# Patient Record
Sex: FEMALE | Race: WHITE | URBAN - NONMETROPOLITAN AREA
[De-identification: names, ages, dates, MRNs, and addresses within clinical notes are randomized per-mention and may not be internally consistent; named-entity substitution may affect disease eponyms.]

---

## 2017-11-14 ENCOUNTER — APPOINTMENT (RX ONLY)
Dept: URBAN - NONMETROPOLITAN AREA CLINIC 22 | Facility: CLINIC | Age: 67
Setting detail: DERMATOLOGY
End: 2017-11-14

## 2017-11-14 VITALS — WEIGHT: 225 LBS | HEIGHT: 67 IN

## 2017-11-14 DIAGNOSIS — D485 NEOPLASM OF UNCERTAIN BEHAVIOR OF SKIN: ICD-10-CM

## 2017-11-14 PROBLEM — D48.5 NEOPLASM OF UNCERTAIN BEHAVIOR OF SKIN: Status: ACTIVE | Noted: 2017-11-14

## 2017-11-14 PROCEDURE — 11100: CPT

## 2017-11-14 PROCEDURE — ? BIOPSY BY SHAVE METHOD

## 2017-11-14 ASSESSMENT — LOCATION SIMPLE DESCRIPTION DERM: LOCATION SIMPLE: LEFT GREAT TOE

## 2017-11-14 ASSESSMENT — LOCATION DETAILED DESCRIPTION DERM: LOCATION DETAILED: LEFT DORSAL GREAT TOE

## 2017-11-14 ASSESSMENT — LOCATION ZONE DERM: LOCATION ZONE: TOE

## 2017-11-14 NOTE — HPI: WARTS ([id number])
Is This A New Presentation, Or A Follow-Up?: Wart
How Severe Are Your Warts?: mild
Additional History: The patient states that her PCP only dug at the lesion.

## 2017-11-14 NOTE — PROCEDURE: BIOPSY BY SHAVE METHOD
Path Notes (To The Dermatopathologist): During biopsy viscous straw colored fluid expressed under keratotic crust with ulceration present and granulation tissue at the base

## 2017-12-05 ENCOUNTER — APPOINTMENT (RX ONLY)
Dept: URBAN - NONMETROPOLITAN AREA CLINIC 22 | Facility: CLINIC | Age: 67
Setting detail: DERMATOLOGY
End: 2017-12-05

## 2017-12-05 VITALS — WEIGHT: 215 LBS | HEIGHT: 67 IN

## 2017-12-05 DIAGNOSIS — M71 OTHER BURSOPATHIES: ICD-10-CM

## 2017-12-05 PROBLEM — M71.30 OTHER BURSAL CYST, UNSPECIFIED SITE: Status: ACTIVE | Noted: 2017-12-05

## 2017-12-05 PROCEDURE — 99212 OFFICE O/P EST SF 10 MIN: CPT

## 2017-12-05 PROCEDURE — ? COUNSELING

## 2017-12-05 PROCEDURE — ? DIAGNOSIS COMMENT

## 2017-12-05 PROCEDURE — ? OTHER

## 2017-12-05 NOTE — PROCEDURE: OTHER
Other (Free Text): Keep area cover with Vaseline. Reminded patient that the feet are more prone to infection. Also discussed signs of infection needs to report to office or ER immediately.
Note Text (......Xxx Chief Complaint.): This diagnosis correlates with
Detail Level: Zone

## 2020-03-02 NOTE — PROCEDURE: BIOPSY BY SHAVE METHOD
Electrodesiccation Text: The wound bed was treated with electrodesiccation after the biopsy was performed. patient was critically ill... Patient was critically ill with a high probability of imminent or life threatening deterioration.